# Patient Record
Sex: FEMALE | Employment: OTHER | URBAN - METROPOLITAN AREA
[De-identification: names, ages, dates, MRNs, and addresses within clinical notes are randomized per-mention and may not be internally consistent; named-entity substitution may affect disease eponyms.]

---

## 2022-10-12 ENCOUNTER — TELEPHONE (OUTPATIENT)
Dept: GASTROENTEROLOGY | Facility: CLINIC | Age: 72
End: 2022-10-12

## 2022-10-12 NOTE — TELEPHONE ENCOUNTER
10/12/22  Screened by: Emily Ramesh    Referring Provider Oscar Thakur    Pre- Screening: There is no height or weight on file to calculate BMI  Has patient been referred for a routine screening Colonoscopy? yes  Is the patient between 39-70 years old? yes      Previous Colonoscopy yes   If yes:    Date: 12/15/2021    Facility: [facility]     Reason:       SCHEDULING STAFF: If the patient is between 39yrs-47yrs, please advise patient to confirm benefits/coverage with their insurance company for a routine screening colonoscopy, some insurance carriers will only cover at [address] or older  If the patient is over 66years old, please schedule an office visit  Does the patient want to see a Gastroenterologist prior to their procedure OR are they having any GI symptoms? no    Has the patient been hospitalized or had abdominal surgery in the past 6 months? no    Does the patient use supplemental oxygen? no    Does the patient take Coumadin, Lovenox, Plavix, Elliquis, Xarelto, or other blood thinning medication? no    Has the patient had a stroke, cardiac event, or stent placed in the past year? no    SCHEDULING STAFF: If patient answers NO to above questions, then schedule procedure  If patient answers YES to above questions, then schedule office appointment  If patient is between 45yrs - 49yrs, please advise patient that we will have to confirm benefits & coverage with their insurance company for a routine screening colonoscopy        PASSED OA

## 2022-10-12 NOTE — TELEPHONE ENCOUNTER
Patients GI provider:  Dr Sol Ibrahim    Number to return call: [phone]    Reason for call: Pt calling to schedule 1 year recall colonoscopy from her prior GI  She is faxing records to office  Patient Passed OA      Scheduled procedure/appointment date if applicable: Apt/procedure NA

## 2022-10-13 ENCOUNTER — TELEPHONE (OUTPATIENT)
Dept: GASTROENTEROLOGY | Facility: CLINIC | Age: 72
End: 2022-10-13

## 2022-12-05 ENCOUNTER — TELEPHONE (OUTPATIENT)
Dept: GASTROENTEROLOGY | Facility: CLINIC | Age: 72
End: 2022-12-05

## 2022-12-05 NOTE — TELEPHONE ENCOUNTER
Patients GI provider:  Dr Laney Lombardo    Number to return call: [phone]    Reason for call: Pt calling because she would like a coupon for Clenpiq    Scheduled procedure/appointment date if applicable: Procedure 44/38/89

## 2022-12-05 NOTE — TELEPHONE ENCOUNTER
Coupons not accepted with patients insurance  A clenpiq sample with instructions was given to patient

## 2022-12-16 ENCOUNTER — ANESTHESIA EVENT (OUTPATIENT)
Dept: GASTROENTEROLOGY | Facility: AMBULATORY SURGERY CENTER | Age: 72
End: 2022-12-16

## 2022-12-16 ENCOUNTER — HOSPITAL ENCOUNTER (OUTPATIENT)
Dept: GASTROENTEROLOGY | Facility: AMBULATORY SURGERY CENTER | Age: 72
Discharge: HOME | End: 2022-12-16
Attending: INTERNAL MEDICINE

## 2022-12-16 ENCOUNTER — ANESTHESIA (OUTPATIENT)
Dept: GASTROENTEROLOGY | Facility: AMBULATORY SURGERY CENTER | Age: 72
End: 2022-12-16

## 2022-12-16 VITALS
TEMPERATURE: 98 F | RESPIRATION RATE: 16 BRPM | SYSTOLIC BLOOD PRESSURE: 141 MMHG | OXYGEN SATURATION: 98 % | WEIGHT: 232 LBS | HEIGHT: 68 IN | DIASTOLIC BLOOD PRESSURE: 74 MMHG | BODY MASS INDEX: 35.16 KG/M2 | HEART RATE: 78 BPM

## 2022-12-16 DIAGNOSIS — Z83.71 FAMILY HISTORY OF COLONIC POLYPS: ICD-10-CM

## 2022-12-16 DIAGNOSIS — Z80.0 FAMILY HISTORY OF COLON CANCER: ICD-10-CM

## 2022-12-16 DIAGNOSIS — Z86.010 HISTORY OF COLON POLYPS: ICD-10-CM

## 2022-12-16 RX ORDER — UBIDECARENONE 75 MG
CAPSULE ORAL DAILY
COMMUNITY

## 2022-12-16 RX ORDER — EZETIMIBE 10 MG/1
10 TABLET ORAL DAILY
COMMUNITY
Start: 2022-12-12

## 2022-12-16 RX ORDER — SODIUM CHLORIDE, SODIUM LACTATE, POTASSIUM CHLORIDE, CALCIUM CHLORIDE 600; 310; 30; 20 MG/100ML; MG/100ML; MG/100ML; MG/100ML
50 INJECTION, SOLUTION INTRAVENOUS CONTINUOUS
Status: DISCONTINUED | OUTPATIENT
Start: 2022-12-16 | End: 2022-12-20 | Stop reason: HOSPADM

## 2022-12-16 RX ORDER — DESLORATADINE 5 MG/1
5 TABLET ORAL DAILY
COMMUNITY

## 2022-12-16 RX ORDER — LEVOTHYROXINE SODIUM 0.12 MG/1
125 TABLET ORAL
COMMUNITY
Start: 2021-12-24 | End: 2022-12-24

## 2022-12-16 RX ORDER — PROPOFOL 10 MG/ML
INJECTION, EMULSION INTRAVENOUS AS NEEDED
Status: DISCONTINUED | OUTPATIENT
Start: 2022-12-16 | End: 2022-12-16

## 2022-12-16 RX ORDER — OMEGA-3-ACID ETHYL ESTERS 1 G/1
2 CAPSULE, LIQUID FILLED ORAL 2 TIMES DAILY
COMMUNITY

## 2022-12-16 RX ORDER — ROSUVASTATIN CALCIUM 40 MG/1
40 TABLET, COATED ORAL DAILY
COMMUNITY
Start: 2022-12-12

## 2022-12-16 RX ORDER — OMEGA-3S/DHA/EPA/FISH OIL 980-1400MG
CAPSULE,DELAYED RELEASE (ENTERIC COATED) ORAL
COMMUNITY

## 2022-12-16 RX ORDER — DIPHENOXYLATE HYDROCHLORIDE AND ATROPINE SULFATE 2.5; .025 MG/1; MG/1
1 TABLET ORAL DAILY
COMMUNITY

## 2022-12-16 RX ORDER — CARVEDILOL 3.12 MG/1
3.12 TABLET ORAL 2 TIMES DAILY WITH MEALS
COMMUNITY
Start: 2022-09-21

## 2022-12-16 RX ORDER — LIDOCAINE HYDROCHLORIDE 10 MG/ML
INJECTION, SOLUTION EPIDURAL; INFILTRATION; INTRACAUDAL; PERINEURAL AS NEEDED
Status: DISCONTINUED | OUTPATIENT
Start: 2022-12-16 | End: 2022-12-16

## 2022-12-16 RX ORDER — OMEPRAZOLE 20 MG/1
20 CAPSULE, DELAYED RELEASE ORAL
COMMUNITY

## 2022-12-16 RX ORDER — AZELASTINE 1 MG/ML
1 SPRAY, METERED NASAL 2 TIMES DAILY
COMMUNITY

## 2022-12-16 RX ORDER — DULOXETIN HYDROCHLORIDE 30 MG/1
30 CAPSULE, DELAYED RELEASE ORAL DAILY
COMMUNITY

## 2022-12-16 RX ORDER — LOSARTAN POTASSIUM 100 MG/1
100 TABLET ORAL DAILY
COMMUNITY
Start: 2022-11-28

## 2022-12-16 RX ORDER — AZITHROMYCIN 500 MG/1
500 TABLET, FILM COATED ORAL DAILY
COMMUNITY

## 2022-12-16 RX ORDER — ALBUTEROL SULFATE 90 UG/1
2 AEROSOL, METERED RESPIRATORY (INHALATION)
COMMUNITY

## 2022-12-16 RX ORDER — PHENTERMINE HYDROCHLORIDE 30 MG/1
30 CAPSULE ORAL EVERY MORNING
COMMUNITY

## 2022-12-16 RX ORDER — NITROGLYCERIN 0.4 MG/1
0.4 TABLET SUBLINGUAL
COMMUNITY

## 2022-12-16 RX ORDER — EPINEPHRINE 0.15 MG/.3ML
0.15 INJECTION INTRAMUSCULAR ONCE
COMMUNITY

## 2022-12-16 RX ORDER — DILTIAZEM HYDROCHLORIDE 60 MG/1
TABLET, FILM COATED ORAL
COMMUNITY
Start: 2022-09-08

## 2022-12-16 RX ORDER — IBUPROFEN 200 MG
1 CAPSULE ORAL DAILY
COMMUNITY

## 2022-12-16 RX ORDER — FAMOTIDINE 20 MG/1
20 TABLET, FILM COATED ORAL 2 TIMES DAILY
COMMUNITY
Start: 2022-10-21

## 2022-12-16 RX ORDER — AMPICILLIN TRIHYDRATE 250 MG
50 CAPSULE ORAL
COMMUNITY

## 2022-12-16 RX ORDER — OMEGA-3S/DHA/EPA/FISH OIL/D3 300MG-1000
400 CAPSULE ORAL DAILY
COMMUNITY

## 2022-12-16 RX ORDER — ASPIRIN 81 MG/1
81 TABLET ORAL DAILY
COMMUNITY

## 2022-12-16 RX ORDER — ACETAMINOPHEN 325 MG/1
650 TABLET ORAL EVERY 6 HOURS PRN
COMMUNITY

## 2022-12-16 RX ADMIN — PROPOFOL 150 MG: 10 INJECTION, EMULSION INTRAVENOUS at 08:04

## 2022-12-16 RX ADMIN — SODIUM CHLORIDE, SODIUM LACTATE, POTASSIUM CHLORIDE, CALCIUM CHLORIDE 50 ML/HR: 600; 310; 30; 20 INJECTION, SOLUTION INTRAVENOUS at 07:48

## 2022-12-16 RX ADMIN — LIDOCAINE HYDROCHLORIDE 50 MG: 10 INJECTION, SOLUTION EPIDURAL; INFILTRATION; INTRACAUDAL; PERINEURAL at 08:04

## 2022-12-16 RX ADMIN — PROPOFOL 20 MG: 10 INJECTION, EMULSION INTRAVENOUS at 08:08

## 2022-12-16 RX ADMIN — PROPOFOL 20 MG: 10 INJECTION, EMULSION INTRAVENOUS at 08:12

## 2022-12-16 RX ADMIN — PROPOFOL 20 MG: 10 INJECTION, EMULSION INTRAVENOUS at 08:19

## 2022-12-16 RX ADMIN — PROPOFOL 20 MG: 10 INJECTION, EMULSION INTRAVENOUS at 08:15

## 2022-12-16 NOTE — H&P
History and Physical - [facility]    Nicolás Bolanos 74 y o  female MRN: [id number]      HPI: Nicolás Bolanos is a 74y o  year old female who presents for personal history of colon polyps  Also with both sons recently diagnosed with colon cancer, stage 4, and stage 1 respectively  Allergies   Allergen Reactions   • Bactrim [Sulfamethoxazole-Trimethoprim] Hives and Fever   • Penicillins Hives   • Sulfa Antibiotics Hives and Fever   • Oxycodone Palpitations         REVIEW OF SYSTEMS: Per the HPI, and otherwise unremarkable      Historical Information     Past Medical History:   Diagnosis Date   • Asthma    • Colon polyp    • Disease of thyroid gland    • Hyperlipidemia    • Hypertension      Past Surgical History:   Procedure Laterality Date   • BACK SURGERY     • COLONOSCOPY     • REPLACEMENT TOTAL KNEE Left    • REPLACEMENT TOTAL KNEE Right    • TOTAL HIP ARTHROPLASTY Right    • TOTAL SHOULDER REPLACEMENT Left      Social History   Social History     Substance and Sexual Activity   Alcohol Use Not Currently     Social History     Substance and Sexual Activity   Drug Use Never     Social History     Tobacco Use   Smoking Status Never   Smokeless Tobacco Never     Family History   Problem Relation Age of Onset   • Colon cancer Son    • Colon polyps Neg Hx        Meds/Allergies       Current Outpatient Medications:   •  aspirin (ECOTRIN LOW STRENGTH) 81 mg EC tablet  •  azelastine (ASTELIN) 0 1 % nasal spray  •  azithromycin (ZITHROMAX) 500 MG tablet  •  desloratadine (CLARINEX) 5 MG tablet  •  DULoxetine (CYMBALTA) 30 mg delayed release capsule  •  levothyroxine 125 mcg tablet  •  phentermine 30 MG capsule  •  albuterol (PROVENTIL HFA,VENTOLIN HFA) 90 mcg/act inhaler  •  carvedilol (COREG) 3 125 mg tablet  •  ezetimibe (ZETIA) 10 mg tablet  •  famotidine (PEPCID) 20 mg tablet  •  losartan (COZAAR) 100 MG tablet  •  omeprazole (PriLOSEC) 20 mg delayed release capsule  • rosuvastatin (CRESTOR) 40 MG tablet  •  Symbicort 80-4 5 MCG/ACT inhaler    Current Facility-Administered Medications:   •  lactated ringers infusion, 50 mL/hr, Intravenous, Continuous, 50 mL/hr at 12/16/22 0748        Objective     BP (!) 174/84   Pulse 94   Temp 98 °F (36 7 °C) (Temporal)   Resp 14   Ht 5' 8" (1 727 m)   Wt 105 kg (232 lb)   SpO2 97%   BMI 35 28 kg/m²       PHYSICAL EXAM    Gen: NAD AAOx3  Head: Normocephalic, Atraumatic  CV: S1S2 RRR no m/r/g  CHEST: Clear b/l no c/r/w  ABD: soft, +BS NT/ND no masses  EXT: no edema      ASSESSMENT/PLAN:  This is a 74y o  year old female here for colonoscopy, and she is stable and optimized for her procedure

## 2022-12-16 NOTE — ANESTHESIA PREPROCEDURE EVALUATION
Procedure:  COLONOSCOPY    Relevant Problems   No relevant active problems      HTN  HLD  Asthma  Hx colon polyps  BMI 35         Anesthesia Plan  ASA Score- 3     Anesthesia Type- IV sedation with anesthesia with ASA Monitors  Additional Monitors:   Airway Plan:           Plan Factors-    Chart reviewed  Patient summary reviewed  Patient is not a current smoker  Patient did not smoke on day of surgery  Induction- intravenous  Postoperative Plan-     Informed Consent- Anesthetic plan and risks discussed with patient  I personally reviewed this patient with the CRNA  Discussed and agreed on the Anesthesia Plan with the CRNA  Alexandro Miller

## 2022-12-16 NOTE — ANESTHESIA POSTPROCEDURE EVALUATION
Post-Op Assessment Note    CV Status:  Stable  Pain Score: 0    Pain management: adequate     Mental Status:  Sleepy   Hydration Status:  Euvolemic   PONV Controlled:  None   Airway Patency:  Patent      Post Op Vitals Reviewed: Yes      Staff: Anesthesiologist, CRNA   Comments: report given to RN; VSS; RA        No notable events documented      BP  126/81   Temp      Pulse  80   Resp   16   SpO2   100

## 2022-12-21 NOTE — RESULT ENCOUNTER NOTE
RECALL 1 year  Called pt, no answer, left   Nursing: Can we see if pt can schedule an OFV in the new year to further discuss if she has a lot of questions?

## 2024-01-24 ENCOUNTER — TELEPHONE (OUTPATIENT)
Age: 74
End: 2024-01-24

## 2024-01-24 ENCOUNTER — TELEPHONE (OUTPATIENT)
Dept: GASTROENTEROLOGY | Facility: CLINIC | Age: 74
End: 2024-01-24

## 2024-01-24 VITALS — HEIGHT: 68 IN | BODY MASS INDEX: 35.28 KG/M2

## 2024-01-24 NOTE — TELEPHONE ENCOUNTER
Scheduled date of colonoscopy (as of today):3/21/24  Physician performing colonoscopy:DR GOMEZ  Location of colonoscopy:[facility]  Bowel prep reviewed with patient:CLENPIQ  Instructions reviewed with patient by:ROSI  Clearances: \    JAGRUTI [phone]    PT REQUESTED CLENPIQ AND ALSO NEEDS THE INSTRUCTIONS SENT PER MAIL IF POSSIBLE

## 2024-03-07 ENCOUNTER — ANESTHESIA EVENT (OUTPATIENT)
Dept: ANESTHESIOLOGY | Facility: AMBULATORY SURGERY CENTER | Age: 74
End: 2024-03-07

## 2024-03-07 ENCOUNTER — ANESTHESIA (OUTPATIENT)
Dept: ANESTHESIOLOGY | Facility: AMBULATORY SURGERY CENTER | Age: 74
End: 2024-03-07

## 2024-03-12 ENCOUNTER — TELEPHONE (OUTPATIENT)
Dept: GASTROENTEROLOGY | Facility: CLINIC | Age: 74
End: 2024-03-12

## 2024-03-12 NOTE — TELEPHONE ENCOUNTER
Procedure confirmed  Colonoscopy     Via: Voice mail    Instructions given: Mail     Prep Given: Clenpiq    Call the office if there are any questions.

## 2024-03-13 DIAGNOSIS — Z12.11 COLON CANCER SCREENING: Primary | ICD-10-CM

## 2024-03-13 RX ORDER — SODIUM PICOSULFATE, MAGNESIUM OXIDE, AND ANHYDROUS CITRIC ACID 12; 3.5; 1 G/175ML; G/175ML; MG/175ML
LIQUID ORAL
Qty: 350 ML | Refills: 0 | Status: SHIPPED | OUTPATIENT
Start: 2024-03-13 | End: 2024-03-21 | Stop reason: HOSPADM

## 2024-03-13 NOTE — TELEPHONE ENCOUNTER
Patients GI provider:  Dr. GOMEZ    Number to return call: [phone]     Reason for call: Pt calling requesting for Clenpiq to be sent to [facility] pharmacy.    Scheduled procedure/appointment date if applicable: 3/21/2024

## 2024-03-21 ENCOUNTER — ANESTHESIA EVENT (OUTPATIENT)
Dept: GASTROENTEROLOGY | Facility: AMBULATORY SURGERY CENTER | Age: 74
End: 2024-03-21

## 2024-03-21 ENCOUNTER — ANESTHESIA (OUTPATIENT)
Dept: GASTROENTEROLOGY | Facility: AMBULATORY SURGERY CENTER | Age: 74
End: 2024-03-21

## 2024-03-21 ENCOUNTER — HOSPITAL ENCOUNTER (OUTPATIENT)
Dept: GASTROENTEROLOGY | Facility: AMBULATORY SURGERY CENTER | Age: 74
Discharge: HOME | End: 2024-03-21
Payer: MEDICARE

## 2024-03-21 VITALS
RESPIRATION RATE: 18 BRPM | DIASTOLIC BLOOD PRESSURE: 70 MMHG | HEART RATE: 67 BPM | OXYGEN SATURATION: 97 % | WEIGHT: 248 LBS | HEIGHT: 68 IN | SYSTOLIC BLOOD PRESSURE: 148 MMHG | BODY MASS INDEX: 37.59 KG/M2 | TEMPERATURE: 98 F

## 2024-03-21 DIAGNOSIS — Z86.010 HISTORY OF COLON POLYPS: ICD-10-CM

## 2024-03-21 DIAGNOSIS — Z80.0 FAMILY HISTORY OF COLON CANCER: ICD-10-CM

## 2024-03-21 PROBLEM — I10 HYPERTENSION: Status: ACTIVE | Noted: 2024-03-21

## 2024-03-21 PROBLEM — M19.90 ARTHRITIS: Status: ACTIVE | Noted: 2024-03-21

## 2024-03-21 PROBLEM — K21.9 GERD (GASTROESOPHAGEAL REFLUX DISEASE): Status: ACTIVE | Noted: 2024-03-21

## 2024-03-21 PROBLEM — E78.5 HYPERLIPIDEMIA: Status: ACTIVE | Noted: 2024-03-21

## 2024-03-21 PROBLEM — J45.909 ASTHMA: Status: ACTIVE | Noted: 2024-03-21

## 2024-03-21 PROBLEM — E07.9 DISEASE OF THYROID GLAND: Status: ACTIVE | Noted: 2024-03-21

## 2024-03-21 PROCEDURE — 45380 COLONOSCOPY AND BIOPSY: CPT | Performed by: INTERNAL MEDICINE

## 2024-03-21 PROCEDURE — 88305 TISSUE EXAM BY PATHOLOGIST: CPT | Performed by: STUDENT IN AN ORGANIZED HEALTH CARE EDUCATION/TRAINING PROGRAM

## 2024-03-21 PROCEDURE — 45385 COLONOSCOPY W/LESION REMOVAL: CPT | Performed by: INTERNAL MEDICINE

## 2024-03-21 RX ORDER — PROPOFOL 10 MG/ML
INJECTION, EMULSION INTRAVENOUS AS NEEDED
Status: DISCONTINUED | OUTPATIENT
Start: 2024-03-21 | End: 2024-03-21

## 2024-03-21 RX ORDER — SODIUM CHLORIDE, SODIUM LACTATE, POTASSIUM CHLORIDE, CALCIUM CHLORIDE 600; 310; 30; 20 MG/100ML; MG/100ML; MG/100ML; MG/100ML
INJECTION, SOLUTION INTRAVENOUS CONTINUOUS PRN
Status: DISCONTINUED | OUTPATIENT
Start: 2024-03-21 | End: 2024-03-21

## 2024-03-21 RX ADMIN — PROPOFOL 100 MG: 10 INJECTION, EMULSION INTRAVENOUS at 08:33

## 2024-03-21 RX ADMIN — PROPOFOL 100 MG: 10 INJECTION, EMULSION INTRAVENOUS at 08:31

## 2024-03-21 RX ADMIN — SODIUM CHLORIDE, SODIUM LACTATE, POTASSIUM CHLORIDE, CALCIUM CHLORIDE: 600; 310; 30; 20 INJECTION, SOLUTION INTRAVENOUS at 08:15

## 2024-03-21 RX ADMIN — PROPOFOL 100 MG: 10 INJECTION, EMULSION INTRAVENOUS at 08:45

## 2024-03-21 RX ADMIN — SODIUM CHLORIDE, SODIUM LACTATE, POTASSIUM CHLORIDE, CALCIUM CHLORIDE 500 ML: 600; 310; 30; 20 INJECTION, SOLUTION INTRAVENOUS at 08:27

## 2024-03-21 RX ADMIN — SODIUM CHLORIDE, SODIUM LACTATE, POTASSIUM CHLORIDE, CALCIUM CHLORIDE: 600; 310; 30; 20 INJECTION, SOLUTION INTRAVENOUS at 08:50

## 2024-03-21 RX ADMIN — PROPOFOL 100 MG: 10 INJECTION, EMULSION INTRAVENOUS at 08:39

## 2024-03-21 NOTE — H&P
History and Physical - [facility]    Annabelle George 74 y.o. female MRN: [id number]      HPI: Annabelle George is a 74 y.o. female who presents for personal hx of colon polyps. Abnormal mucosa in the ICV with incipient tubular adenoma in 2022. S/p R hemicolectomy in 1/2023.     Allergies   Allergen Reactions    Bactrim [Sulfamethoxazole-Trimethoprim] Hives and Fever    Penicillins Hives    Sulfa Antibiotics Hives and Fever    Oxycodone Palpitations         REVIEW OF SYSTEMS: Per the HPI, and otherwise unremarkable.    Historical Information     Past Medical History:   Diagnosis Date    Arthritis 3/21/2024    Asthma 3/21/2024    Colon polyp     Disease of thyroid gland 3/21/2024    GERD (gastroesophageal reflux disease) 3/21/2024    Hyperlipidemia 3/21/2024    Hypertension 3/21/2024     Past Surgical History:   Procedure Laterality Date    BACK SURGERY      COLONOSCOPY      HEMICOLECTOMY Right 01/24/2023    REPLACEMENT TOTAL KNEE Left     REPLACEMENT TOTAL KNEE Right     TOTAL HIP ARTHROPLASTY Right     TOTAL SHOULDER REPLACEMENT Left      Social History   Social History     Substance and Sexual Activity   Alcohol Use Not Currently     Social History     Substance and Sexual Activity   Drug Use Never     Social History     Tobacco Use   Smoking Status Never   Smokeless Tobacco Never     Family History   Problem Relation Age of Onset    Colon cancer Son     Colon polyps Neg Hx        Meds/Allergies       Current Outpatient Medications:     acetaminophen (TYLENOL) 325 mg tablet    aspirin (ECOTRIN LOW STRENGTH) 81 mg EC tablet    azithromycin (ZITHROMAX) 500 MG tablet    calcium citrate (CALCITRATE) 950 (200 Ca) MG tablet    carvedilol (COREG) 3.125 mg tablet    cholecalciferol (VITAMIN D3) 400 units tablet    Co Q-10 50 MG    cyanocobalamin (VITAMIN B-12) 100 mcg tablet    desloratadine (CLARINEX) 5 MG tablet    DULoxetine (CYMBALTA) 30 mg delayed release capsule    famotidine  "(PEPCID) 20 mg tablet    Fish Oil-Cholecalciferol (Fish Oil + D3) 6800-5955 MG-UNIT CAPS    levothyroxine 125 mcg tablet    losartan (COZAAR) 100 MG tablet    Misc Natural Products (OSTEO BI-FLEX TRIPLE STRENGTH PO)    multivitamin (THERAGRAN) TABS    omega-3-acid ethyl esters (LOVAZA) 1 g capsule    omeprazole (PriLOSEC) 20 mg delayed release capsule    POTASSIUM GLUCONATE PO    rosuvastatin (CRESTOR) 40 MG tablet    sodium picosulfate, magnesium oxide, citric acid (Clenpiq) oral solution    Symbicort 80-4.5 MCG/ACT inhaler    albuterol (PROVENTIL HFA,VENTOLIN HFA) 90 mcg/act inhaler    azelastine (ASTELIN) 0.1 % nasal spray    Chromium 1 MG CAPS    EPINEPHrine (EPIPEN JR) 0.15 mg/0.3 mL SOAJ    ezetimibe (ZETIA) 10 mg tablet    nitroglycerin (NITROSTAT) 0.4 mg SL tablet    phentermine 30 MG capsule  No current facility-administered medications for this encounter.    Facility-Administered Medications Ordered in Other Encounters:     lactated ringers infusion, , Intravenous, Continuous PRN, 500 mL at 03/21/24 0827        Objective     BP (!) 177/86   Pulse 80   Temp 98 °F (36.7 °C) (Temporal)   Resp 17   Ht 5' 8\" (1.727 m)   Wt 112 kg (248 lb)   SpO2 97%   BMI 37.71 kg/m²       PHYSICAL EXAM    Gen: NAD AAOx3  Head: Normocephalic, Atraumatic  CV: S1S2 RRR no m/r/g  CHEST: Clear b/l no c/r/w  ABD: soft, +BS NT/ND no masses  EXT: no edema      ASSESSMENT/PLAN:  This is a 74 y.o. year old female here for colonoscopy, and she is stable and optimized for her procedure.        "

## 2024-03-21 NOTE — ANESTHESIA PREPROCEDURE EVALUATION
Procedure:  COLONOSCOPY    Relevant Problems   CARDIO   (+) Hyperlipidemia   (+) Hypertension      GI/HEPATIC   (+) GERD (gastroesophageal reflux disease)      MUSCULOSKELETAL   (+) Arthritis      PULMONARY   (+) Asthma      Endocrine   (+) Disease of thyroid gland        Physical Exam    Airway    Mallampati score: II  TM Distance: >3 FB  Neck ROM: full     Dental   No notable dental hx     Cardiovascular  Cardiovascular exam normal    Pulmonary  Pulmonary exam normal     Other Findings  post-pubertal.      Anesthesia Plan  ASA Score- 2     Anesthesia Type- IV sedation with anesthesia with ASA Monitors.         Additional Monitors:     Airway Plan:     Comment: I discussed risks (reviewed with patient on the anesthesia consent form), benefits and alternatives of monitored sedation including the possibility under sedation to have recall or mild discomfort.  .       Plan Factors-    Chart reviewed.    Patient summary reviewed.                  Induction- intravenous.    Postoperative Plan-     Informed Consent- Anesthetic plan and risks discussed with patient.  I personally reviewed this patient with the CRNA. Discussed and agreed on the Anesthesia Plan with the CRNA..

## 2024-03-21 NOTE — ANESTHESIA POSTPROCEDURE EVALUATION
Post-Op Assessment Note    CV Status:  Stable  Pain Score: 0    Pain management: adequate       Mental Status:  Alert and awake   Hydration Status:  Euvolemic   PONV Controlled:  Controlled   Airway Patency:  Patent     Post Op Vitals Reviewed: Yes    No anethesia notable event occurred.    Staff: CRNA               BP   140/63   Temp   98   Pulse  68   Resp   16   SpO2   100

## 2024-03-27 ENCOUNTER — NURSE TRIAGE (OUTPATIENT)
Age: 74
End: 2024-03-27

## 2024-03-27 PROCEDURE — 88305 TISSUE EXAM BY PATHOLOGIST: CPT | Performed by: STUDENT IN AN ORGANIZED HEALTH CARE EDUCATION/TRAINING PROGRAM

## 2024-03-27 NOTE — TELEPHONE ENCOUNTER
"Reason for Disposition   Information only question and nurse able to answer    Answer Assessment - Initial Assessment Questions  1. REASON FOR CALL or QUESTION: \"What is your reason for calling today?\" or \"How can I best help you?\" or \"What question do you have that I can help answer?\"            Pt. Called for pathology results, reviewed results with pt. With recommendations, pt. Verbalized understanding, no further review needed    Protocols used: Information Only Call - No Triage-ADULT-[place]    "
2.83

## 2024-03-27 NOTE — RESULT ENCOUNTER NOTE
TK: RECALL none.    Nursing please relay message:    Dear Annabelle,     The polyps from the colonoscopy are completely benign. Nothing to worry about at this point.     Your next colonoscopy would be due in 5 years - at which point you will be 79 - so we can discuss then if you would like to continue your surveillance!    Dr Wasserman

## 2025-07-24 ENCOUNTER — OFFICE VISIT (OUTPATIENT)
Age: 75
End: 2025-07-24
Payer: MEDICARE

## 2025-07-24 ENCOUNTER — APPOINTMENT (OUTPATIENT)
Age: 75
End: 2025-07-24
Attending: INTERNAL MEDICINE
Payer: MEDICARE

## 2025-07-24 DIAGNOSIS — R49.0 HOARSENESS: Primary | ICD-10-CM

## 2025-07-24 DIAGNOSIS — R06.02 SHORTNESS OF BREATH: ICD-10-CM

## 2025-07-24 LAB
BASOPHILS # BLD AUTO: 0.05 THOUSANDS/ÂΜL (ref 0–0.1)
BASOPHILS NFR BLD AUTO: 1 % (ref 0–1)
EOSINOPHIL # BLD AUTO: 0.2 THOUSAND/ÂΜL (ref 0–0.61)
EOSINOPHIL NFR BLD AUTO: 2 % (ref 0–6)
ERYTHROCYTE [DISTWIDTH] IN BLOOD BY AUTOMATED COUNT: 13.9 % (ref 11.6–15.1)
HCT VFR BLD AUTO: 43.9 % (ref 34.8–46.1)
HGB BLD-MCNC: 13.5 G/DL (ref 11.5–15.4)
IMM GRANULOCYTES # BLD AUTO: 0.04 THOUSAND/UL (ref 0–0.2)
IMM GRANULOCYTES NFR BLD AUTO: 1 % (ref 0–2)
LYMPHOCYTES # BLD AUTO: 1.72 THOUSANDS/ÂΜL (ref 0.6–4.47)
LYMPHOCYTES NFR BLD AUTO: 21 % (ref 14–44)
MCH RBC QN AUTO: 28 PG (ref 26.8–34.3)
MCHC RBC AUTO-ENTMCNC: 30.8 G/DL (ref 31.4–37.4)
MCV RBC AUTO: 91 FL (ref 82–98)
MONOCYTES # BLD AUTO: 0.57 THOUSAND/ÂΜL (ref 0.17–1.22)
MONOCYTES NFR BLD AUTO: 7 % (ref 4–12)
NEUTROPHILS # BLD AUTO: 5.63 THOUSANDS/ÂΜL (ref 1.85–7.62)
NEUTS SEG NFR BLD AUTO: 68 % (ref 43–75)
NRBC BLD AUTO-RTO: 0 /100 WBCS
PLATELET # BLD AUTO: 228 THOUSANDS/UL (ref 149–390)
PMV BLD AUTO: 10.8 FL (ref 8.9–12.7)
RBC # BLD AUTO: 4.83 MILLION/UL (ref 3.81–5.12)
WBC # BLD AUTO: 8.21 THOUSAND/UL (ref 4.31–10.16)

## 2025-07-24 PROCEDURE — 85025 COMPLETE CBC W/AUTO DIFF WBC: CPT

## 2025-07-24 PROCEDURE — 36415 COLL VENOUS BLD VENIPUNCTURE: CPT

## 2025-07-24 PROCEDURE — 92507 TX SP LANG VOICE COMM INDIV: CPT

## 2025-07-24 NOTE — PROGRESS NOTES
Speech Treatment Note    Today's date: 2025  Patient name: Annabelle George  : 1950  MRN: [id number]  Referring provider: No ref. provider found  Dx:   Encounter Diagnosis     ICD-10-CM    1. Hoarseness  R49.0            Patient continues with mild dysphonia characterized by hoarse vocal quality.  She continues to report multiple medication changes re: pulmonary medications and inhalers.  We did discuss that hoarseness may be related to inhalers and medications that patient has been on; however she does report long history of inhaler use prior to onset of voice problems.  Patient remains highly motivated and engaged during sessions.  Unfortunately, the voice amplifier that patient purchased is not working correctly.  Will try an alternate system in future session.          Visit Number:7    Subjective/Behavioral: Patient awake and alert.      Goal 1:Patient will consistently achieve > 75 dB SPL at phrase-conversation level given min cues at 90% x 3 to improve vocal intensity and intelligibility: avg 76 dB    Goal 2:Patient will complete sustained phonation tasks with open vowel /a/ for 20+ seconds with less than 5 pitch breaks x 3 sessions to improve vocal quality: - not able to sustain 20+ seconds, however no pitch breaks noted.    Goal 3:Patient will complete deep breathing exercises with min cues x 3 sessions to improve breath support for voicing: Continue to focus on deep breathing, diaphragmatic breathing, breath control and support as emphasis of treatment at this time.  Patient able to complete pursed lip breathing with 39-43.7 second controlled exhale.      Goal 4: Patient will complete tension reducing tasks for larynx and articulators at + x 3 to reduce laryngeal tension: +     Other:Patient was provided with home exercises/ activies to target goals in plan of care.  Recommendations:Continue with Plan of Care

## 2025-07-31 ENCOUNTER — OFFICE VISIT (OUTPATIENT)
Age: 75
End: 2025-07-31
Payer: MEDICARE

## 2025-07-31 DIAGNOSIS — R49.0 HOARSENESS: Primary | ICD-10-CM

## 2025-07-31 PROCEDURE — 92507 TX SP LANG VOICE COMM INDIV: CPT

## 2025-08-14 ENCOUNTER — OFFICE VISIT (OUTPATIENT)
Age: 75
End: 2025-08-14
Payer: MEDICARE

## 2025-08-16 ENCOUNTER — HOSPITAL ENCOUNTER (EMERGENCY)
Age: 75
Discharge: HOME | End: 2025-08-16
Attending: EMERGENCY MEDICINE | Admitting: EMERGENCY MEDICINE
Payer: MEDICARE

## 2025-08-16 VITALS
WEIGHT: 264.11 LBS | BODY MASS INDEX: 40.03 KG/M2 | TEMPERATURE: 96.6 F | OXYGEN SATURATION: 98 % | RESPIRATION RATE: 16 BRPM | DIASTOLIC BLOOD PRESSURE: 79 MMHG | SYSTOLIC BLOOD PRESSURE: 158 MMHG | HEIGHT: 68 IN | HEART RATE: 64 BPM

## 2025-08-16 DIAGNOSIS — S05.00XA CORNEAL ABRASION: Primary | ICD-10-CM

## 2025-08-16 PROCEDURE — 99282 EMERGENCY DEPT VISIT SF MDM: CPT

## 2025-08-16 RX ORDER — TRAMADOL HYDROCHLORIDE 25 MG/1
25 TABLET, COATED ORAL EVERY 6 HOURS PRN
Qty: 4 TABLET | Refills: 0 | Status: SHIPPED | OUTPATIENT
Start: 2025-08-16 | End: 2025-08-20

## 2025-08-16 RX ORDER — TETRACAINE HYDROCHLORIDE 5 MG/ML
1 SOLUTION OPHTHALMIC ONCE
Status: COMPLETED | OUTPATIENT
Start: 2025-08-16 | End: 2025-08-16

## 2025-08-16 RX ORDER — TOBRAMYCIN 3 MG/ML
1 SOLUTION/ DROPS OPHTHALMIC ONCE
Status: COMPLETED | OUTPATIENT
Start: 2025-08-16 | End: 2025-08-16

## 2025-08-16 RX ADMIN — TETRACAINE HYDROCHLORIDE 1 DROP: 5 SOLUTION OPHTHALMIC at 12:23

## 2025-08-16 RX ADMIN — TOBRAMYCIN 1 DROP: 3 SOLUTION/ DROPS OPHTHALMIC at 12:25

## 2025-08-18 ENCOUNTER — OFFICE VISIT (OUTPATIENT)
Age: 75
End: 2025-08-18
Payer: MEDICARE

## 2025-08-18 DIAGNOSIS — R49.0 HOARSENESS: Primary | ICD-10-CM

## 2025-08-18 PROCEDURE — 92507 TX SP LANG VOICE COMM INDIV: CPT

## 2025-08-19 ENCOUNTER — OFFICE VISIT (OUTPATIENT)
Age: 75
End: 2025-08-19
Payer: MEDICARE

## 2025-08-19 DIAGNOSIS — R49.0 HOARSENESS: Primary | ICD-10-CM

## 2025-08-19 PROCEDURE — 92507 TX SP LANG VOICE COMM INDIV: CPT

## 2025-08-20 ENCOUNTER — OFFICE VISIT (OUTPATIENT)
Age: 75
End: 2025-08-20
Payer: MEDICARE

## 2025-08-20 DIAGNOSIS — R49.0 HOARSENESS: Primary | ICD-10-CM

## 2025-08-20 PROCEDURE — 92507 TX SP LANG VOICE COMM INDIV: CPT

## 2025-08-21 ENCOUNTER — OFFICE VISIT (OUTPATIENT)
Age: 75
End: 2025-08-21
Payer: MEDICARE

## 2025-08-21 DIAGNOSIS — R49.0 HOARSENESS: Primary | ICD-10-CM

## 2025-08-21 PROCEDURE — 92507 TX SP LANG VOICE COMM INDIV: CPT
